# Patient Record
Sex: FEMALE | URBAN - METROPOLITAN AREA
[De-identification: names, ages, dates, MRNs, and addresses within clinical notes are randomized per-mention and may not be internally consistent; named-entity substitution may affect disease eponyms.]

---

## 2020-01-23 ENCOUNTER — OUTPATIENT (OUTPATIENT)
Dept: OUTPATIENT SERVICES | Facility: HOSPITAL | Age: 30
LOS: 1 days | End: 2020-01-23
Payer: MEDICAID

## 2020-01-23 DIAGNOSIS — Z3A.00 WEEKS OF GESTATION OF PREGNANCY NOT SPECIFIED: ICD-10-CM

## 2020-01-23 DIAGNOSIS — O26.899 OTHER SPECIFIED PREGNANCY RELATED CONDITIONS, UNSPECIFIED TRIMESTER: ICD-10-CM

## 2020-01-23 LAB
GLUCOSE BLDC GLUCOMTR-MCNC: 109 MG/DL — HIGH (ref 70–99)
GLUCOSE BLDC GLUCOMTR-MCNC: 78 MG/DL — SIGNIFICANT CHANGE UP (ref 70–99)

## 2020-01-23 PROCEDURE — 76819 FETAL BIOPHYS PROFIL W/O NST: CPT | Mod: 26

## 2020-01-23 PROCEDURE — G0463: CPT

## 2020-01-23 PROCEDURE — 82962 GLUCOSE BLOOD TEST: CPT

## 2020-01-23 PROCEDURE — 59025 FETAL NON-STRESS TEST: CPT | Mod: 26

## 2020-01-23 PROCEDURE — 76815 OB US LIMITED FETUS(S): CPT

## 2020-01-23 PROCEDURE — 59025 FETAL NON-STRESS TEST: CPT

## 2020-01-23 PROCEDURE — 76819 FETAL BIOPHYS PROFIL W/O NST: CPT

## 2020-01-23 PROCEDURE — 76815 OB US LIMITED FETUS(S): CPT | Mod: 26

## 2020-01-25 ENCOUNTER — OUTPATIENT (OUTPATIENT)
Dept: OUTPATIENT SERVICES | Facility: HOSPITAL | Age: 30
LOS: 1 days | End: 2020-01-25
Payer: MEDICAID

## 2020-01-25 DIAGNOSIS — O26.899 OTHER SPECIFIED PREGNANCY RELATED CONDITIONS, UNSPECIFIED TRIMESTER: ICD-10-CM

## 2020-01-25 DIAGNOSIS — Z3A.00 WEEKS OF GESTATION OF PREGNANCY NOT SPECIFIED: ICD-10-CM

## 2020-01-25 LAB — GLUCOSE BLDC GLUCOMTR-MCNC: 86 MG/DL — SIGNIFICANT CHANGE UP (ref 70–99)

## 2020-01-25 PROCEDURE — 59025 FETAL NON-STRESS TEST: CPT | Mod: 26

## 2020-01-25 PROCEDURE — 82962 GLUCOSE BLOOD TEST: CPT

## 2020-01-25 PROCEDURE — 59025 FETAL NON-STRESS TEST: CPT

## 2020-01-25 PROCEDURE — 76819 FETAL BIOPHYS PROFIL W/O NST: CPT | Mod: 26

## 2020-01-25 PROCEDURE — 76819 FETAL BIOPHYS PROFIL W/O NST: CPT

## 2020-01-25 PROCEDURE — G0463: CPT

## 2020-01-27 ENCOUNTER — OUTPATIENT (OUTPATIENT)
Dept: OUTPATIENT SERVICES | Facility: HOSPITAL | Age: 30
LOS: 1 days | End: 2020-01-27
Payer: MEDICAID

## 2020-01-27 DIAGNOSIS — Z3A.00 WEEKS OF GESTATION OF PREGNANCY NOT SPECIFIED: ICD-10-CM

## 2020-01-27 DIAGNOSIS — O26.899 OTHER SPECIFIED PREGNANCY RELATED CONDITIONS, UNSPECIFIED TRIMESTER: ICD-10-CM

## 2020-01-27 LAB — GLUCOSE BLDC GLUCOMTR-MCNC: 145 MG/DL — HIGH (ref 70–99)

## 2020-01-27 PROCEDURE — G0463: CPT

## 2020-01-27 PROCEDURE — 59025 FETAL NON-STRESS TEST: CPT | Mod: 26

## 2020-01-27 PROCEDURE — 59025 FETAL NON-STRESS TEST: CPT

## 2020-01-27 PROCEDURE — 82962 GLUCOSE BLOOD TEST: CPT

## 2020-02-13 ENCOUNTER — TRANSCRIPTION ENCOUNTER (OUTPATIENT)
Age: 30
End: 2020-02-13

## 2020-02-13 ENCOUNTER — INPATIENT (INPATIENT)
Facility: HOSPITAL | Age: 30
LOS: 2 days | Discharge: HOME | End: 2020-02-16
Attending: OBSTETRICS & GYNECOLOGY | Admitting: OBSTETRICS & GYNECOLOGY
Payer: MEDICAID

## 2020-02-13 ENCOUNTER — RESULT REVIEW (OUTPATIENT)
Age: 30
End: 2020-02-13

## 2020-02-13 VITALS — WEIGHT: 178.57 LBS | HEIGHT: 60 IN

## 2020-02-13 DIAGNOSIS — Z3A.00 WEEKS OF GESTATION OF PREGNANCY NOT SPECIFIED: ICD-10-CM

## 2020-02-13 DIAGNOSIS — Z34.80 ENCOUNTER FOR SUPERVISION OF OTHER NORMAL PREGNANCY, UNSPECIFIED TRIMESTER: ICD-10-CM

## 2020-02-13 DIAGNOSIS — O26.899 OTHER SPECIFIED PREGNANCY RELATED CONDITIONS, UNSPECIFIED TRIMESTER: ICD-10-CM

## 2020-02-13 LAB
ALBUMIN SERPL ELPH-MCNC: 2.5 G/DL — LOW (ref 3.5–5)
ALP SERPL-CCNC: 233 U/L — HIGH (ref 40–120)
ALT FLD-CCNC: 53 U/L DA — SIGNIFICANT CHANGE UP (ref 10–60)
ANION GAP SERPL CALC-SCNC: 8 MMOL/L — SIGNIFICANT CHANGE UP (ref 5–17)
APPEARANCE UR: ABNORMAL
APTT BLD: 27.4 SEC — LOW (ref 27.5–36.3)
AST SERPL-CCNC: 39 U/L — SIGNIFICANT CHANGE UP (ref 10–40)
BACTERIA # UR AUTO: ABNORMAL /HPF
BASOPHILS # BLD AUTO: 0.02 K/UL — SIGNIFICANT CHANGE UP (ref 0–0.2)
BASOPHILS NFR BLD AUTO: 0.2 % — SIGNIFICANT CHANGE UP (ref 0–2)
BILIRUB SERPL-MCNC: 0.3 MG/DL — SIGNIFICANT CHANGE UP (ref 0.2–1.2)
BILIRUB UR-MCNC: NEGATIVE — SIGNIFICANT CHANGE UP
BLD GP AB SCN SERPL QL: SIGNIFICANT CHANGE UP
BUN SERPL-MCNC: 12 MG/DL — SIGNIFICANT CHANGE UP (ref 7–18)
CALCIUM SERPL-MCNC: 8.4 MG/DL — SIGNIFICANT CHANGE UP (ref 8.4–10.5)
CHLORIDE SERPL-SCNC: 106 MMOL/L — SIGNIFICANT CHANGE UP (ref 96–108)
CO2 SERPL-SCNC: 23 MMOL/L — SIGNIFICANT CHANGE UP (ref 22–31)
COLOR SPEC: YELLOW — SIGNIFICANT CHANGE UP
CREAT ?TM UR-MCNC: 101 MG/DL — SIGNIFICANT CHANGE UP
CREAT SERPL-MCNC: 0.62 MG/DL — SIGNIFICANT CHANGE UP (ref 0.5–1.3)
DIFF PNL FLD: ABNORMAL
EOSINOPHIL # BLD AUTO: 0.44 K/UL — SIGNIFICANT CHANGE UP (ref 0–0.5)
EOSINOPHIL NFR BLD AUTO: 4.9 % — SIGNIFICANT CHANGE UP (ref 0–6)
EPI CELLS # UR: ABNORMAL /HPF
FIBRINOGEN PPP-MCNC: 650 MG/DL — HIGH (ref 350–510)
GLUCOSE BLDC GLUCOMTR-MCNC: 102 MG/DL — HIGH (ref 70–99)
GLUCOSE BLDC GLUCOMTR-MCNC: 105 MG/DL — HIGH (ref 70–99)
GLUCOSE SERPL-MCNC: 99 MG/DL — SIGNIFICANT CHANGE UP (ref 70–99)
GLUCOSE UR QL: NEGATIVE — SIGNIFICANT CHANGE UP
HCT VFR BLD CALC: 37.8 % — SIGNIFICANT CHANGE UP (ref 34.5–45)
HGB BLD-MCNC: 12.1 G/DL — SIGNIFICANT CHANGE UP (ref 11.5–15.5)
IMM GRANULOCYTES NFR BLD AUTO: 0.3 % — SIGNIFICANT CHANGE UP (ref 0–1.5)
INR BLD: 0.96 RATIO — SIGNIFICANT CHANGE UP (ref 0.88–1.16)
KETONES UR-MCNC: NEGATIVE — SIGNIFICANT CHANGE UP
LDH SERPL L TO P-CCNC: 240 U/L — HIGH (ref 120–225)
LEUKOCYTE ESTERASE UR-ACNC: ABNORMAL
LYMPHOCYTES # BLD AUTO: 1.18 K/UL — SIGNIFICANT CHANGE UP (ref 1–3.3)
LYMPHOCYTES # BLD AUTO: 13.2 % — SIGNIFICANT CHANGE UP (ref 13–44)
MCHC RBC-ENTMCNC: 26.8 PG — LOW (ref 27–34)
MCHC RBC-ENTMCNC: 32 GM/DL — SIGNIFICANT CHANGE UP (ref 32–36)
MCV RBC AUTO: 83.6 FL — SIGNIFICANT CHANGE UP (ref 80–100)
MONOCYTES # BLD AUTO: 0.48 K/UL — SIGNIFICANT CHANGE UP (ref 0–0.9)
MONOCYTES NFR BLD AUTO: 5.4 % — SIGNIFICANT CHANGE UP (ref 2–14)
NEUTROPHILS # BLD AUTO: 6.76 K/UL — SIGNIFICANT CHANGE UP (ref 1.8–7.4)
NEUTROPHILS NFR BLD AUTO: 76 % — SIGNIFICANT CHANGE UP (ref 43–77)
NITRITE UR-MCNC: NEGATIVE — SIGNIFICANT CHANGE UP
NRBC # BLD: 0 /100 WBCS — SIGNIFICANT CHANGE UP (ref 0–0)
PH UR: 5 — SIGNIFICANT CHANGE UP (ref 5–8)
PLATELET # BLD AUTO: 252 K/UL — SIGNIFICANT CHANGE UP (ref 150–400)
POTASSIUM SERPL-MCNC: 3.6 MMOL/L — SIGNIFICANT CHANGE UP (ref 3.5–5.3)
POTASSIUM SERPL-SCNC: 3.6 MMOL/L — SIGNIFICANT CHANGE UP (ref 3.5–5.3)
PROT ?TM UR-MCNC: 29 MG/DL — HIGH (ref 0–12)
PROT SERPL-MCNC: 7.2 G/DL — SIGNIFICANT CHANGE UP (ref 6–8.3)
PROT UR-MCNC: 15
PROTHROM AB SERPL-ACNC: 10.6 SEC — SIGNIFICANT CHANGE UP (ref 10–12.9)
RBC # BLD: 4.52 M/UL — SIGNIFICANT CHANGE UP (ref 3.8–5.2)
RBC # FLD: 15.1 % — HIGH (ref 10.3–14.5)
RBC CASTS # UR COMP ASSIST: >50 /HPF (ref 0–2)
SODIUM SERPL-SCNC: 137 MMOL/L — SIGNIFICANT CHANGE UP (ref 135–145)
SP GR SPEC: 1.02 — SIGNIFICANT CHANGE UP (ref 1.01–1.02)
URATE SERPL-MCNC: 3.8 MG/DL — SIGNIFICANT CHANGE UP (ref 2.5–7)
UROBILINOGEN FLD QL: 1
WBC # BLD: 8.91 K/UL — SIGNIFICANT CHANGE UP (ref 3.8–10.5)
WBC # FLD AUTO: 8.91 K/UL — SIGNIFICANT CHANGE UP (ref 3.8–10.5)
WBC UR QL: ABNORMAL /HPF (ref 0–5)

## 2020-02-13 PROCEDURE — 88307 TISSUE EXAM BY PATHOLOGIST: CPT | Mod: 26

## 2020-02-13 RX ORDER — DIPHENHYDRAMINE HCL 50 MG
25 CAPSULE ORAL EVERY 4 HOURS
Refills: 0 | Status: DISCONTINUED | OUTPATIENT
Start: 2020-02-13 | End: 2020-02-16

## 2020-02-13 RX ORDER — SODIUM CHLORIDE 9 MG/ML
1000 INJECTION, SOLUTION INTRAVENOUS
Refills: 0 | Status: DISCONTINUED | OUTPATIENT
Start: 2020-02-13 | End: 2020-02-14

## 2020-02-13 RX ORDER — AMPICILLIN TRIHYDRATE 250 MG
1 CAPSULE ORAL EVERY 4 HOURS
Refills: 0 | Status: DISCONTINUED | OUTPATIENT
Start: 2020-02-13 | End: 2020-02-14

## 2020-02-13 RX ORDER — SODIUM CHLORIDE 9 MG/ML
1000 INJECTION INTRAMUSCULAR; INTRAVENOUS; SUBCUTANEOUS
Refills: 0 | Status: DISCONTINUED | OUTPATIENT
Start: 2020-02-13 | End: 2020-02-14

## 2020-02-13 RX ORDER — AMPICILLIN TRIHYDRATE 250 MG
2 CAPSULE ORAL ONCE
Refills: 0 | Status: COMPLETED | OUTPATIENT
Start: 2020-02-13 | End: 2020-02-13

## 2020-02-13 RX ORDER — CITRIC ACID/SODIUM CITRATE 300-500 MG
15 SOLUTION, ORAL ORAL EVERY 6 HOURS
Refills: 0 | Status: DISCONTINUED | OUTPATIENT
Start: 2020-02-13 | End: 2020-02-14

## 2020-02-13 RX ADMIN — SODIUM CHLORIDE 125 MILLILITER(S): 9 INJECTION INTRAMUSCULAR; INTRAVENOUS; SUBCUTANEOUS at 17:03

## 2020-02-13 RX ADMIN — Medication 216 GRAM(S): at 17:03

## 2020-02-13 RX ADMIN — Medication 108 GRAM(S): at 21:50

## 2020-02-14 LAB
ABO RH CONFIRMATION: SIGNIFICANT CHANGE UP
BASOPHILS # BLD AUTO: 0.02 K/UL — SIGNIFICANT CHANGE UP (ref 0–0.2)
BASOPHILS NFR BLD AUTO: 0.2 % — SIGNIFICANT CHANGE UP (ref 0–2)
EOSINOPHIL # BLD AUTO: 0.26 K/UL — SIGNIFICANT CHANGE UP (ref 0–0.5)
EOSINOPHIL NFR BLD AUTO: 2.6 % — SIGNIFICANT CHANGE UP (ref 0–6)
GLUCOSE BLDC GLUCOMTR-MCNC: 88 MG/DL — SIGNIFICANT CHANGE UP (ref 70–99)
HCT VFR BLD CALC: 29 % — LOW (ref 34.5–45)
HGB BLD-MCNC: 9.1 G/DL — LOW (ref 11.5–15.5)
IMM GRANULOCYTES NFR BLD AUTO: 0.2 % — SIGNIFICANT CHANGE UP (ref 0–1.5)
LYMPHOCYTES # BLD AUTO: 1.37 K/UL — SIGNIFICANT CHANGE UP (ref 1–3.3)
LYMPHOCYTES # BLD AUTO: 13.5 % — SIGNIFICANT CHANGE UP (ref 13–44)
MCHC RBC-ENTMCNC: 26.5 PG — LOW (ref 27–34)
MCHC RBC-ENTMCNC: 31.4 GM/DL — LOW (ref 32–36)
MCV RBC AUTO: 84.3 FL — SIGNIFICANT CHANGE UP (ref 80–100)
MONOCYTES # BLD AUTO: 0.51 K/UL — SIGNIFICANT CHANGE UP (ref 0–0.9)
MONOCYTES NFR BLD AUTO: 5 % — SIGNIFICANT CHANGE UP (ref 2–14)
NEUTROPHILS # BLD AUTO: 7.96 K/UL — HIGH (ref 1.8–7.4)
NEUTROPHILS NFR BLD AUTO: 78.5 % — HIGH (ref 43–77)
NRBC # BLD: 0 /100 WBCS — SIGNIFICANT CHANGE UP (ref 0–0)
PLATELET # BLD AUTO: 209 K/UL — SIGNIFICANT CHANGE UP (ref 150–400)
RBC # BLD: 3.44 M/UL — LOW (ref 3.8–5.2)
RBC # FLD: 15.4 % — HIGH (ref 10.3–14.5)
T PALLIDUM AB TITR SER: NEGATIVE — SIGNIFICANT CHANGE UP
WBC # BLD: 10.14 K/UL — SIGNIFICANT CHANGE UP (ref 3.8–10.5)
WBC # FLD AUTO: 10.14 K/UL — SIGNIFICANT CHANGE UP (ref 3.8–10.5)

## 2020-02-14 RX ORDER — KETOROLAC TROMETHAMINE 30 MG/ML
30 SYRINGE (ML) INJECTION EVERY 6 HOURS
Refills: 0 | Status: DISCONTINUED | OUTPATIENT
Start: 2020-02-14 | End: 2020-02-15

## 2020-02-14 RX ORDER — HEPARIN SODIUM 5000 [USP'U]/ML
5000 INJECTION INTRAVENOUS; SUBCUTANEOUS EVERY 12 HOURS
Refills: 0 | Status: DISCONTINUED | OUTPATIENT
Start: 2020-02-14 | End: 2020-02-16

## 2020-02-14 RX ORDER — AZITHROMYCIN 500 MG/1
500 TABLET, FILM COATED ORAL ONCE
Refills: 0 | Status: COMPLETED | OUTPATIENT
Start: 2020-02-14 | End: 2020-02-14

## 2020-02-14 RX ORDER — OXYCODONE HYDROCHLORIDE 5 MG/1
5 TABLET ORAL
Refills: 0 | Status: DISCONTINUED | OUTPATIENT
Start: 2020-02-14 | End: 2020-02-14

## 2020-02-14 RX ORDER — ASCORBIC ACID 60 MG
500 TABLET,CHEWABLE ORAL DAILY
Refills: 0 | Status: DISCONTINUED | OUTPATIENT
Start: 2020-02-14 | End: 2020-02-16

## 2020-02-14 RX ORDER — SIMETHICONE 80 MG/1
80 TABLET, CHEWABLE ORAL EVERY 6 HOURS
Refills: 0 | Status: DISCONTINUED | OUTPATIENT
Start: 2020-02-14 | End: 2020-02-16

## 2020-02-14 RX ORDER — SIMETHICONE 80 MG/1
80 TABLET, CHEWABLE ORAL EVERY 4 HOURS
Refills: 0 | Status: DISCONTINUED | OUTPATIENT
Start: 2020-02-14 | End: 2020-02-14

## 2020-02-14 RX ORDER — FERROUS SULFATE 325(65) MG
325 TABLET ORAL DAILY
Refills: 0 | Status: DISCONTINUED | OUTPATIENT
Start: 2020-02-14 | End: 2020-02-16

## 2020-02-14 RX ORDER — CEFAZOLIN SODIUM 1 G
2000 VIAL (EA) INJECTION ONCE
Refills: 0 | Status: COMPLETED | OUTPATIENT
Start: 2020-02-14 | End: 2020-02-14

## 2020-02-14 RX ORDER — OXYTOCIN 10 UNIT/ML
333.33 VIAL (ML) INJECTION
Qty: 20 | Refills: 0 | Status: DISCONTINUED | OUTPATIENT
Start: 2020-02-14 | End: 2020-02-16

## 2020-02-14 RX ORDER — CALAMINE AND ZINC OXIDE AND PHENOL 160; 10 MG/ML; MG/ML
1 LOTION TOPICAL EVERY 8 HOURS
Refills: 0 | Status: DISCONTINUED | OUTPATIENT
Start: 2020-02-14 | End: 2020-02-16

## 2020-02-14 RX ORDER — LANOLIN
1 OINTMENT (GRAM) TOPICAL EVERY 6 HOURS
Refills: 0 | Status: DISCONTINUED | OUTPATIENT
Start: 2020-02-14 | End: 2020-02-16

## 2020-02-14 RX ORDER — SODIUM CHLORIDE 9 MG/ML
1000 INJECTION, SOLUTION INTRAVENOUS
Refills: 0 | Status: DISCONTINUED | OUTPATIENT
Start: 2020-02-14 | End: 2020-02-16

## 2020-02-14 RX ORDER — MORPHINE SULFATE 50 MG/1
4 CAPSULE, EXTENDED RELEASE ORAL EVERY 4 HOURS
Refills: 0 | Status: DISCONTINUED | OUTPATIENT
Start: 2020-02-14 | End: 2020-02-16

## 2020-02-14 RX ORDER — DIPHENHYDRAMINE HCL 50 MG
25 CAPSULE ORAL EVERY 6 HOURS
Refills: 0 | Status: DISCONTINUED | OUTPATIENT
Start: 2020-02-14 | End: 2020-02-16

## 2020-02-14 RX ORDER — ACETAMINOPHEN 500 MG
975 TABLET ORAL
Refills: 0 | Status: DISCONTINUED | OUTPATIENT
Start: 2020-02-14 | End: 2020-02-16

## 2020-02-14 RX ORDER — ACETAMINOPHEN 500 MG
1000 TABLET ORAL ONCE
Refills: 0 | Status: COMPLETED | OUTPATIENT
Start: 2020-02-14 | End: 2020-02-14

## 2020-02-14 RX ORDER — IBUPROFEN 200 MG
600 TABLET ORAL EVERY 6 HOURS
Refills: 0 | Status: DISCONTINUED | OUTPATIENT
Start: 2020-02-14 | End: 2020-02-16

## 2020-02-14 RX ORDER — TETANUS TOXOID, REDUCED DIPHTHERIA TOXOID AND ACELLULAR PERTUSSIS VACCINE, ADSORBED 5; 2.5; 8; 8; 2.5 [IU]/.5ML; [IU]/.5ML; UG/.5ML; UG/.5ML; UG/.5ML
0.5 SUSPENSION INTRAMUSCULAR ONCE
Refills: 0 | Status: DISCONTINUED | OUTPATIENT
Start: 2020-02-14 | End: 2020-02-16

## 2020-02-14 RX ADMIN — AZITHROMYCIN 255 MILLIGRAM(S): 500 TABLET, FILM COATED ORAL at 05:42

## 2020-02-14 RX ADMIN — Medication 975 MILLIGRAM(S): at 22:20

## 2020-02-14 RX ADMIN — HEPARIN SODIUM 5000 UNIT(S): 5000 INJECTION INTRAVENOUS; SUBCUTANEOUS at 18:05

## 2020-02-14 RX ADMIN — Medication 500 MILLIGRAM(S): at 12:58

## 2020-02-14 RX ADMIN — Medication 25 MILLIGRAM(S): at 02:48

## 2020-02-14 RX ADMIN — CALAMINE AND ZINC OXIDE AND PHENOL 1 APPLICATION(S): 160; 10 LOTION TOPICAL at 22:32

## 2020-02-14 RX ADMIN — Medication 30 MILLIGRAM(S): at 13:28

## 2020-02-14 RX ADMIN — Medication 15 MILLILITER(S): at 04:51

## 2020-02-14 RX ADMIN — Medication 100 MILLIGRAM(S): at 04:51

## 2020-02-14 RX ADMIN — Medication 400 MILLIGRAM(S): at 07:45

## 2020-02-14 RX ADMIN — Medication 30 MILLIGRAM(S): at 12:58

## 2020-02-14 RX ADMIN — Medication 1000 MILLIGRAM(S): at 08:15

## 2020-02-14 RX ADMIN — Medication 30 MILLIGRAM(S): at 18:31

## 2020-02-14 RX ADMIN — MORPHINE SULFATE 4 MILLIGRAM(S): 50 CAPSULE, EXTENDED RELEASE ORAL at 07:43

## 2020-02-14 RX ADMIN — Medication 1 TABLET(S): at 12:58

## 2020-02-14 RX ADMIN — SODIUM CHLORIDE 125 MILLILITER(S): 9 INJECTION, SOLUTION INTRAVENOUS at 04:00

## 2020-02-14 RX ADMIN — Medication 108 GRAM(S): at 00:53

## 2020-02-14 RX ADMIN — MORPHINE SULFATE 4 MILLIGRAM(S): 50 CAPSULE, EXTENDED RELEASE ORAL at 08:15

## 2020-02-14 RX ADMIN — Medication 975 MILLIGRAM(S): at 23:00

## 2020-02-14 RX ADMIN — Medication 30 MILLIGRAM(S): at 18:01

## 2020-02-14 RX ADMIN — Medication 1000 MILLIUNIT(S)/MIN: at 05:48

## 2020-02-14 RX ADMIN — SIMETHICONE 80 MILLIGRAM(S): 80 TABLET, CHEWABLE ORAL at 18:11

## 2020-02-14 NOTE — LACTATION INITIAL EVALUATION - LACTATION INTERVENTIONS
Breastfeeding on cue 8-12X/24 hours with diaper count to assess for adequate intake, safe skin to skin and rooming-in encouraged.

## 2020-02-14 NOTE — CHART NOTE - NSCHARTNOTEFT_GEN_A_CORE
OB PA Postop Note    Patient seen at bedside, resting comfortably offers no new complaints. Lane in place, tolerating clear diet at this time.  Attempting breastfeeding.  Denies HA, CP, SOB, N/V/D, dizziness, palpitations, worsening abdominal pain, worsening vaginal bleeding, or any other concerns.      Vital Signs Last 24 Hrs  T(C): 36.7 (14 Feb 2020 09:33), Max: 37 (14 Feb 2020 07:15)  T(F): 98.1 (14 Feb 2020 09:33), Max: 98.6 (14 Feb 2020 07:15)  HR: 78 (14 Feb 2020 09:33) (77 - 110)  BP: 119/78 (14 Feb 2020 09:33) (102/67 - 123/75)  BP(mean): 75 (14 Feb 2020 08:45) (73 - 84)  RR: 16 (14 Feb 2020 09:33) (16 - 25)  SpO2: 97% (14 Feb 2020 09:33) (97% - 100%)    Gen: A&O x 3, NAD  Chest: CTA B/L  Cardiac: S1, S2  RRR  Breast: Soft, nontender, nonengorged  Abdomen: +BS; soft; NT; ND; Dressing C/D/I, fundus firm  Gyn: Minimal lochia  Ext: Nontender, no worsening edema, venodynes               A/P: 28y/o POD#0 s/p stat primary c/s @40 weeks 5 days suspected abruption GDMA1, stable     -Pain management, postop care  -heparin dvt prophylaxis  -OOB and early ambulate  -incentive spirometry  -Advance diet with flatus  -Encourage breastfeeding   -d/w Dr. Junior

## 2020-02-15 LAB
BASOPHILS # BLD AUTO: 0.03 K/UL — SIGNIFICANT CHANGE UP (ref 0–0.2)
BASOPHILS NFR BLD AUTO: 0.3 % — SIGNIFICANT CHANGE UP (ref 0–2)
EOSINOPHIL # BLD AUTO: 0.51 K/UL — HIGH (ref 0–0.5)
EOSINOPHIL NFR BLD AUTO: 4.6 % — SIGNIFICANT CHANGE UP (ref 0–6)
GLUCOSE BLDC GLUCOMTR-MCNC: 90 MG/DL — SIGNIFICANT CHANGE UP (ref 70–99)
HCT VFR BLD CALC: 30.9 % — LOW (ref 34.5–45)
HGB BLD-MCNC: 9.8 G/DL — LOW (ref 11.5–15.5)
IMM GRANULOCYTES NFR BLD AUTO: 0.3 % — SIGNIFICANT CHANGE UP (ref 0–1.5)
LYMPHOCYTES # BLD AUTO: 1.8 K/UL — SIGNIFICANT CHANGE UP (ref 1–3.3)
LYMPHOCYTES # BLD AUTO: 16.3 % — SIGNIFICANT CHANGE UP (ref 13–44)
MCHC RBC-ENTMCNC: 26.9 PG — LOW (ref 27–34)
MCHC RBC-ENTMCNC: 31.7 GM/DL — LOW (ref 32–36)
MCV RBC AUTO: 84.9 FL — SIGNIFICANT CHANGE UP (ref 80–100)
MONOCYTES # BLD AUTO: 0.66 K/UL — SIGNIFICANT CHANGE UP (ref 0–0.9)
MONOCYTES NFR BLD AUTO: 6 % — SIGNIFICANT CHANGE UP (ref 2–14)
NEUTROPHILS # BLD AUTO: 8 K/UL — HIGH (ref 1.8–7.4)
NEUTROPHILS NFR BLD AUTO: 72.5 % — SIGNIFICANT CHANGE UP (ref 43–77)
NRBC # BLD: 0 /100 WBCS — SIGNIFICANT CHANGE UP (ref 0–0)
PLATELET # BLD AUTO: 242 K/UL — SIGNIFICANT CHANGE UP (ref 150–400)
RBC # BLD: 3.64 M/UL — LOW (ref 3.8–5.2)
RBC # FLD: 15.5 % — HIGH (ref 10.3–14.5)
WBC # BLD: 11.03 K/UL — HIGH (ref 3.8–10.5)
WBC # FLD AUTO: 11.03 K/UL — HIGH (ref 3.8–10.5)

## 2020-02-15 RX ADMIN — SIMETHICONE 80 MILLIGRAM(S): 80 TABLET, CHEWABLE ORAL at 23:49

## 2020-02-15 RX ADMIN — HEPARIN SODIUM 5000 UNIT(S): 5000 INJECTION INTRAVENOUS; SUBCUTANEOUS at 18:04

## 2020-02-15 RX ADMIN — Medication 975 MILLIGRAM(S): at 23:49

## 2020-02-15 RX ADMIN — Medication 30 MILLIGRAM(S): at 06:15

## 2020-02-15 RX ADMIN — Medication 1 TABLET(S): at 11:18

## 2020-02-15 RX ADMIN — SIMETHICONE 80 MILLIGRAM(S): 80 TABLET, CHEWABLE ORAL at 11:18

## 2020-02-15 RX ADMIN — Medication 325 MILLIGRAM(S): at 11:18

## 2020-02-15 RX ADMIN — Medication 600 MILLIGRAM(S): at 12:00

## 2020-02-15 RX ADMIN — Medication 500 MILLIGRAM(S): at 11:18

## 2020-02-15 RX ADMIN — Medication 30 MILLIGRAM(S): at 00:40

## 2020-02-15 RX ADMIN — SIMETHICONE 80 MILLIGRAM(S): 80 TABLET, CHEWABLE ORAL at 05:30

## 2020-02-15 RX ADMIN — Medication 30 MILLIGRAM(S): at 00:05

## 2020-02-15 RX ADMIN — SIMETHICONE 80 MILLIGRAM(S): 80 TABLET, CHEWABLE ORAL at 00:00

## 2020-02-15 RX ADMIN — Medication 30 MILLIGRAM(S): at 05:37

## 2020-02-15 RX ADMIN — Medication 600 MILLIGRAM(S): at 21:00

## 2020-02-15 RX ADMIN — HEPARIN SODIUM 5000 UNIT(S): 5000 INJECTION INTRAVENOUS; SUBCUTANEOUS at 05:37

## 2020-02-15 RX ADMIN — Medication 600 MILLIGRAM(S): at 20:04

## 2020-02-15 RX ADMIN — Medication 600 MILLIGRAM(S): at 11:18

## 2020-02-15 NOTE — PROGRESS NOTE ADULT - SUBJECTIVE AND OBJECTIVE BOX
[facility] offers no new complaints. + Ambulation, voiding without difficulty, + flatus; tolerating regular diet. both breastfeeding and bottle feeding. Denies HA, CP, SOB, N/V/D,  no bm; dizziness, palpitations, worsening abdominal pain, worsening vaginal bleeding, or any other concerns.   Vital Signs Last 24 Hrs  T(C): 36.8 (15 Feb 2020 06:17), Max: 37 (15 Feb 2020 01:33)  T(F): 98.3 (15 Feb 2020 06:17), Max: 98.6 (15 Feb 2020 01:33)  HR: 86 (15 Feb 2020 06:17) (72 - 89)  BP: 107/73 (15 Feb 2020 06:17) (97/63 - 119/78)  BP(mean): --  RR: 17 (15 Feb 2020 06:17) (16 - 17)  SpO2: 97% (15 Feb 2020 06:17) (97% - 98%)    Gen: A&O x 3, NAD  Chest: CTABL  Cardiac: S1+S2+ RRR  Breast: Soft, nontender, nonengorged  Abdomen: +BS; soft; Nontender, nondistended, dressing removed Incision C/D/I steri strips in place   Gyn: Minimal lochia  Extremities: Nontender, DTRS 2+, scattered rash on LE                         9.8    11.03 )-----------( 242      ( 15 Feb 2020 06:36 )             30.9       A/P: 29 year old POD #1 s/p c/s     -Pain management as needed  -cont post op care  -adv diet to regular   -OOB and ambulate  -f/u Rpt CBC in am   -encourage incentive spirometer use  -Encourage breastfeeding   -d/w dr. Junior

## 2020-02-15 NOTE — PROGRESS NOTE ADULT - PROBLEM SELECTOR PLAN 1
-Pain management as needed  -cont post op care  -adv diet to regular   -OOB and ambulate  -f/u Rpt CBC in am   -encourage incentive spirometer use  -Encourage breastfeeding   -d/w dr. Junior

## 2020-02-16 ENCOUNTER — TRANSCRIPTION ENCOUNTER (OUTPATIENT)
Age: 30
End: 2020-02-16

## 2020-02-16 VITALS
DIASTOLIC BLOOD PRESSURE: 58 MMHG | OXYGEN SATURATION: 98 % | TEMPERATURE: 98 F | SYSTOLIC BLOOD PRESSURE: 96 MMHG | HEART RATE: 84 BPM | RESPIRATION RATE: 16 BRPM

## 2020-02-16 DIAGNOSIS — D62 ACUTE POSTHEMORRHAGIC ANEMIA: ICD-10-CM

## 2020-02-16 LAB
BASOPHILS # BLD AUTO: 0.03 K/UL — SIGNIFICANT CHANGE UP (ref 0–0.2)
BASOPHILS NFR BLD AUTO: 0.3 % — SIGNIFICANT CHANGE UP (ref 0–2)
BILE AC FLD-MCNC: 5.3 UMOL/L — SIGNIFICANT CHANGE UP (ref 0–10)
EOSINOPHIL # BLD AUTO: 0.7 K/UL — HIGH (ref 0–0.5)
EOSINOPHIL NFR BLD AUTO: 6.5 % — HIGH (ref 0–6)
HCT VFR BLD CALC: 28 % — LOW (ref 34.5–45)
HGB BLD-MCNC: 8.6 G/DL — LOW (ref 11.5–15.5)
IMM GRANULOCYTES NFR BLD AUTO: 0.5 % — SIGNIFICANT CHANGE UP (ref 0–1.5)
LYMPHOCYTES # BLD AUTO: 18.7 % — SIGNIFICANT CHANGE UP (ref 13–44)
LYMPHOCYTES # BLD AUTO: 2 K/UL — SIGNIFICANT CHANGE UP (ref 1–3.3)
MCHC RBC-ENTMCNC: 26.1 PG — LOW (ref 27–34)
MCHC RBC-ENTMCNC: 30.7 GM/DL — LOW (ref 32–36)
MCV RBC AUTO: 85.1 FL — SIGNIFICANT CHANGE UP (ref 80–100)
MONOCYTES # BLD AUTO: 0.64 K/UL — SIGNIFICANT CHANGE UP (ref 0–0.9)
MONOCYTES NFR BLD AUTO: 6 % — SIGNIFICANT CHANGE UP (ref 2–14)
NEUTROPHILS # BLD AUTO: 7.3 K/UL — SIGNIFICANT CHANGE UP (ref 1.8–7.4)
NEUTROPHILS NFR BLD AUTO: 68 % — SIGNIFICANT CHANGE UP (ref 43–77)
NRBC # BLD: 0 /100 WBCS — SIGNIFICANT CHANGE UP (ref 0–0)
PLATELET # BLD AUTO: 222 K/UL — SIGNIFICANT CHANGE UP (ref 150–400)
RBC # BLD: 3.29 M/UL — LOW (ref 3.8–5.2)
RBC # FLD: 15.5 % — HIGH (ref 10.3–14.5)
WBC # BLD: 10.72 K/UL — HIGH (ref 3.8–10.5)
WBC # FLD AUTO: 10.72 K/UL — HIGH (ref 3.8–10.5)

## 2020-02-16 PROCEDURE — 88307 TISSUE EXAM BY PATHOLOGIST: CPT

## 2020-02-16 PROCEDURE — 86923 COMPATIBILITY TEST ELECTRIC: CPT

## 2020-02-16 PROCEDURE — 85730 THROMBOPLASTIN TIME PARTIAL: CPT

## 2020-02-16 PROCEDURE — 59050 FETAL MONITOR W/REPORT: CPT

## 2020-02-16 PROCEDURE — 80053 COMPREHEN METABOLIC PANEL: CPT

## 2020-02-16 PROCEDURE — 86900 BLOOD TYPING SEROLOGIC ABO: CPT

## 2020-02-16 PROCEDURE — G0463: CPT

## 2020-02-16 PROCEDURE — 85384 FIBRINOGEN ACTIVITY: CPT

## 2020-02-16 PROCEDURE — 82570 ASSAY OF URINE CREATININE: CPT

## 2020-02-16 PROCEDURE — 59025 FETAL NON-STRESS TEST: CPT

## 2020-02-16 PROCEDURE — 81001 URINALYSIS AUTO W/SCOPE: CPT

## 2020-02-16 PROCEDURE — 86780 TREPONEMA PALLIDUM: CPT

## 2020-02-16 PROCEDURE — 86850 RBC ANTIBODY SCREEN: CPT

## 2020-02-16 PROCEDURE — 36415 COLL VENOUS BLD VENIPUNCTURE: CPT

## 2020-02-16 PROCEDURE — 84550 ASSAY OF BLOOD/URIC ACID: CPT

## 2020-02-16 PROCEDURE — 86901 BLOOD TYPING SEROLOGIC RH(D): CPT

## 2020-02-16 PROCEDURE — 85027 COMPLETE CBC AUTOMATED: CPT

## 2020-02-16 PROCEDURE — 85610 PROTHROMBIN TIME: CPT

## 2020-02-16 PROCEDURE — 82962 GLUCOSE BLOOD TEST: CPT

## 2020-02-16 PROCEDURE — 83615 LACTATE (LD) (LDH) ENZYME: CPT

## 2020-02-16 PROCEDURE — 82239 BILE ACIDS TOTAL: CPT

## 2020-02-16 PROCEDURE — 84156 ASSAY OF PROTEIN URINE: CPT

## 2020-02-16 RX ORDER — DOCUSATE SODIUM 100 MG
1 CAPSULE ORAL
Qty: 60 | Refills: 0
Start: 2020-02-16 | End: 2020-03-16

## 2020-02-16 RX ORDER — FERROUS SULFATE 325(65) MG
1 TABLET ORAL
Qty: 30 | Refills: 0
Start: 2020-02-16 | End: 2020-03-16

## 2020-02-16 RX ORDER — IBUPROFEN 200 MG
1 TABLET ORAL
Qty: 20 | Refills: 0
Start: 2020-02-16 | End: 2020-02-20

## 2020-02-16 RX ADMIN — Medication 1 TABLET(S): at 13:16

## 2020-02-16 RX ADMIN — HEPARIN SODIUM 5000 UNIT(S): 5000 INJECTION INTRAVENOUS; SUBCUTANEOUS at 06:13

## 2020-02-16 RX ADMIN — Medication 600 MILLIGRAM(S): at 05:36

## 2020-02-16 RX ADMIN — Medication 500 MILLIGRAM(S): at 13:16

## 2020-02-16 RX ADMIN — Medication 325 MILLIGRAM(S): at 13:16

## 2020-02-16 RX ADMIN — SIMETHICONE 80 MILLIGRAM(S): 80 TABLET, CHEWABLE ORAL at 05:36

## 2020-02-16 RX ADMIN — SIMETHICONE 80 MILLIGRAM(S): 80 TABLET, CHEWABLE ORAL at 13:16

## 2020-02-16 RX ADMIN — Medication 975 MILLIGRAM(S): at 00:40

## 2020-02-16 NOTE — DISCHARGE NOTE OB - CARE PLAN
Principal Discharge DX:	 delivery delivered  Goal:	follow up with own ob/gyn in 2 weeks.  Assessment and plan of treatment:	no sex, pelvic rest, no heavy lifting  Secondary Diagnosis:	Acute blood loss as cause of postoperative anemia  Goal:	take iron and colace  Assessment and plan of treatment:	drink plenty of fluids

## 2020-02-16 NOTE — DISCHARGE NOTE OB - PATIENT PORTAL LINK FT
You can access the FollowMyHealth Patient Portal offered by [facility] by registering at the following website: [email or web address]. By joining [employer]’s FollowMyHealth portal, you will also be able to view your health information using other applications (apps) compatible with our system.

## 2020-02-16 NOTE — PROGRESS NOTE ADULT - SUBJECTIVE AND OBJECTIVE BOX
Patient seen at bedisde resting comfortably offers no new complaints. + Ambulation, + void without difficulty, + flatus; tolerating regular diet. both breastfeeding and bottle feeding. Denies HA, CP, SOB, N/V/D,  no bm; dizziness, palpitations, worsening abdominal pain, worsening vaginal bleeding, or any other concerns. pt wants to go home  Vital Signs Last 24 Hrs  T(C): 36.9 (16 Feb 2020 05:52), Max: 37 (15 Feb 2020 22:25)  T(F): 98.5 (16 Feb 2020 05:52), Max: 98.6 (15 Feb 2020 22:25)  HR: 90 (16 Feb 2020 05:52) (80 - 96)  BP: 112/75 (16 Feb 2020 05:52) (100/66 - 112/75)  BP(mean): --  RR: 17 (16 Feb 2020 05:52) (16 - 17)  SpO2: 97% (16 Feb 2020 05:52) (97% - 97%)    Gen: A&O x 3, NAD  Chest: CTABL  Cardiac: S1+S2+ RRR  Breast: Soft, nontender, nonengorged  Abdomen: +BS; soft; Nontender, nondistended, dressing removed Incision C/D/I steri strips in place   Gyn: Minimal lochia  Extremities: Nontender, DTRS 2+, no worsening edema                        8.6    10.72 )-----------( 222      ( 16 Feb 2020 07:02 )             28.0       A/P: POD #2 s/p c/s; acute blood loss anemia; asymptomatic  pt seen at bedside by   - discharge instructions  d/c home  -d/w dr.williams clayton

## 2020-02-16 NOTE — DISCHARGE NOTE OB - MEDICATION SUMMARY - MEDICATIONS TO TAKE
I will START or STAY ON the medications listed below when I get home from the hospital:    ibuprofen 600 mg oral tablet  -- 1 tab(s) by mouth every 6 hours, As needed, Moderate Pain (4 - 6)  -- Indication: For pain as needed    ferrous sulfate 325 mg (65 mg elemental iron) oral delayed release tablet  -- 1 tab(s) by mouth once a day   -- May discolor urine or feces.  Swallow whole.  Do not crush.    -- Indication: For anemia    Colace 100 mg oral capsule  -- 1 cap(s) by mouth 2 times a day   -- Medication should be taken with plenty of water.    -- Indication: For Stool softner

## 2020-02-16 NOTE — DISCHARGE NOTE OB - CARE PROVIDER_API CALL
Fide Saeed)  Obstetrics and Gynecology  [address] [place] [address]  Phone: [phone]  Fax: [phone]  Follow Up Time: 2 weeks

## 2020-02-16 NOTE — DISCHARGE NOTE OB - PLAN OF CARE
follow up with own ob/gyn in 2 weeks. no sex, pelvic rest, no heavy lifting take iron and colace drink plenty of fluids

## 2020-02-16 NOTE — PROGRESS NOTE ADULT - PROBLEM SELECTOR PLAN 2
A/P: POD #2 s/p c/s; acute blood loss anemia; asymptomatic  pt seen at bedside by   - discharge instructions  d/c home  -d/w dr.williams clayton

## 2020-09-02 NOTE — DISCHARGE NOTE OB - PERSISTENT HEADACHE, BLURRED VISION
Pt wanted to make sure that Dr Lydia Cardoso was going to send Prilosec to the pharmacy for him. Statement Selected

## 2022-10-21 PROBLEM — Z00.00 ENCOUNTER FOR PREVENTIVE HEALTH EXAMINATION: Status: ACTIVE | Noted: 2022-10-21

## 2022-10-24 ENCOUNTER — APPOINTMENT (OUTPATIENT)
Dept: ANTEPARTUM | Facility: CLINIC | Age: 32
End: 2022-10-24

## 2022-10-24 ENCOUNTER — NON-APPOINTMENT (OUTPATIENT)
Age: 32
End: 2022-10-24

## 2022-10-24 ENCOUNTER — ASOB RESULT (OUTPATIENT)
Age: 32
End: 2022-10-24

## 2022-10-24 PROCEDURE — 76801 OB US < 14 WKS SINGLE FETUS: CPT

## 2022-11-28 ENCOUNTER — APPOINTMENT (OUTPATIENT)
Dept: ANTEPARTUM | Facility: CLINIC | Age: 32
End: 2022-11-28

## 2023-01-09 ENCOUNTER — APPOINTMENT (OUTPATIENT)
Dept: ANTEPARTUM | Facility: CLINIC | Age: 33
End: 2023-01-09

## 2023-06-09 ENCOUNTER — INPATIENT (INPATIENT)
Facility: HOSPITAL | Age: 33
LOS: 0 days | Discharge: HOME | DRG: 776 | End: 2023-06-10
Attending: OBSTETRICS & GYNECOLOGY | Admitting: OBSTETRICS & GYNECOLOGY
Payer: COMMERCIAL

## 2023-06-09 VITALS
DIASTOLIC BLOOD PRESSURE: 89 MMHG | HEART RATE: 71 BPM | HEIGHT: 61 IN | WEIGHT: 195.11 LBS | SYSTOLIC BLOOD PRESSURE: 134 MMHG | TEMPERATURE: 99 F | RESPIRATION RATE: 16 BRPM

## 2023-06-09 LAB
HCT VFR BLD CALC: 32.8 % — LOW (ref 34.5–45)
HGB BLD-MCNC: 10.1 G/DL — LOW (ref 11.5–15.5)
MCHC RBC-ENTMCNC: 22.4 PG — LOW (ref 27–34)
MCHC RBC-ENTMCNC: 30.8 GM/DL — LOW (ref 32–36)
MCV RBC AUTO: 72.7 FL — LOW (ref 80–100)
PLATELET # BLD AUTO: 292 K/UL — SIGNIFICANT CHANGE UP (ref 150–400)
RBC # BLD: 4.51 M/UL — SIGNIFICANT CHANGE UP (ref 3.8–5.2)
RBC # FLD: 17.9 % — HIGH (ref 10.3–14.5)
WBC # BLD: 14.34 K/UL — HIGH (ref 3.8–10.5)
WBC # FLD AUTO: 14.34 K/UL — HIGH (ref 3.8–10.5)

## 2023-06-09 RX ORDER — KETOROLAC TROMETHAMINE 30 MG/ML
30 SYRINGE (ML) INJECTION ONCE
Refills: 0 | Status: DISCONTINUED | OUTPATIENT
Start: 2023-06-09 | End: 2023-06-09

## 2023-06-09 NOTE — OB RN PATIENT PROFILE - FALL HARM RISK - RISK INTERVENTIONS
Assistance OOB with selected safe patient handling equipment/Assistance with ambulation/Communicate Fall Risk and Risk Factors to all staff, patient, and family/Reinforce activity limits and safety measures with patient and family/Sit up slowly, dangle for a short time, stand at bedside before walking/Visual Cue: Yellow wristband/Bed in lowest position, wheels locked, appropriate side rails in place/Call bell, personal items and telephone in reach/Instruct patient to call for assistance before getting out of bed or chair/Non-slip footwear when patient is out of bed/[place] to call system/Physically safe environment - no spills, clutter or unnecessary equipment/Purposeful Proactive Rounding/Room/bathroom lighting operational, light cord in reach

## 2023-06-09 NOTE — OB RN PATIENT PROFILE - AS SC BRADEN SENSORY
Patient reports she has an infected finger that has turned green and she us unable to bend it. She requested an appointment. PSR unable to schedule same day with IM per protocol.     Please call back ASAP today to discuss recommendations.    (4) no impairment

## 2023-06-09 NOTE — OB RN PATIENT PROFILE - FUNCTIONAL ASSESSMENT - DAILY ACTIVITY 4.
3 = A little assistance Ivermectin Counseling:  Patient instructed to take medication on an empty stomach with a full glass of water.  Patient informed of potential adverse effects including but not limited to nausea, diarrhea, dizziness, itching, and swelling of the extremities or lymph nodes.  The patient verbalized understanding of the proper use and possible adverse effects of ivermectin.  All of the patient's questions and concerns were addressed.

## 2023-06-09 NOTE — OB RN PATIENT PROFILE - FUNCTIONAL ASSESSMENT - BASIC MOBILITY 6.
3-calculated by average/Not able to assess (calculate score using [facility] averaging method)

## 2023-06-10 ENCOUNTER — TRANSCRIPTION ENCOUNTER (OUTPATIENT)
Age: 33
End: 2023-06-10

## 2023-06-10 VITALS
SYSTOLIC BLOOD PRESSURE: 123 MMHG | HEART RATE: 66 BPM | DIASTOLIC BLOOD PRESSURE: 73 MMHG | RESPIRATION RATE: 18 BRPM | OXYGEN SATURATION: 98 % | TEMPERATURE: 98 F

## 2023-06-10 DIAGNOSIS — Z98.891 HISTORY OF UTERINE SCAR FROM PREVIOUS SURGERY: Chronic | ICD-10-CM

## 2023-06-10 LAB
ABO RH CONFIRMATION: SIGNIFICANT CHANGE UP
ALBUMIN SERPL ELPH-MCNC: 3.1 G/DL — LOW (ref 3.3–5.2)
ALP SERPL-CCNC: 209 U/L — HIGH (ref 40–120)
ALT FLD-CCNC: 16 U/L — SIGNIFICANT CHANGE UP
ANION GAP SERPL CALC-SCNC: 12 MMOL/L — SIGNIFICANT CHANGE UP (ref 5–17)
ANISOCYTOSIS BLD QL: SLIGHT — SIGNIFICANT CHANGE UP
APTT BLD: 24 SEC — LOW (ref 27.5–35.5)
AST SERPL-CCNC: 24 U/L — SIGNIFICANT CHANGE UP
BASOPHILS # BLD AUTO: 0 K/UL — SIGNIFICANT CHANGE UP (ref 0–0.2)
BASOPHILS NFR BLD AUTO: 0 % — SIGNIFICANT CHANGE UP (ref 0–2)
BILIRUB SERPL-MCNC: <0.2 MG/DL — LOW (ref 0.4–2)
BLD GP AB SCN SERPL QL: SIGNIFICANT CHANGE UP
BUN SERPL-MCNC: 12 MG/DL — SIGNIFICANT CHANGE UP (ref 8–20)
CALCIUM SERPL-MCNC: 8.6 MG/DL — SIGNIFICANT CHANGE UP (ref 8.4–10.5)
CHLORIDE SERPL-SCNC: 102 MMOL/L — SIGNIFICANT CHANGE UP (ref 96–108)
CO2 SERPL-SCNC: 21 MMOL/L — LOW (ref 22–29)
CREAT SERPL-MCNC: 0.64 MG/DL — SIGNIFICANT CHANGE UP (ref 0.5–1.3)
EGFR: 120 ML/MIN/1.73M2 — SIGNIFICANT CHANGE UP
EOSINOPHIL # BLD AUTO: 0 K/UL — SIGNIFICANT CHANGE UP (ref 0–0.5)
EOSINOPHIL NFR BLD AUTO: 0 % — SIGNIFICANT CHANGE UP (ref 0–6)
FIBRINOGEN PPP-MCNC: 542 MG/DL — HIGH (ref 200–450)
GLUCOSE SERPL-MCNC: 140 MG/DL — HIGH (ref 70–99)
HBV SURFACE AG SERPL QL IA: SIGNIFICANT CHANGE UP
HIV 1 & 2 AB SERPL IA.RAPID: SIGNIFICANT CHANGE UP
HIV 1+2 AB+HIV1 P24 AG SERPL QL IA: SIGNIFICANT CHANGE UP
INR BLD: 0.94 RATIO — SIGNIFICANT CHANGE UP (ref 0.88–1.16)
LDH SERPL L TO P-CCNC: 268 U/L — HIGH (ref 98–192)
LYMPHOCYTES # BLD AUTO: 1.38 K/UL — SIGNIFICANT CHANGE UP (ref 1–3.3)
LYMPHOCYTES # BLD AUTO: 9.6 % — LOW (ref 13–44)
MANUAL SMEAR VERIFICATION: SIGNIFICANT CHANGE UP
MICROCYTES BLD QL: SLIGHT — SIGNIFICANT CHANGE UP
MONOCYTES # BLD AUTO: 0.99 K/UL — HIGH (ref 0–0.9)
MONOCYTES NFR BLD AUTO: 6.9 % — SIGNIFICANT CHANGE UP (ref 2–14)
NEUTROPHILS # BLD AUTO: 11.97 K/UL — HIGH (ref 1.8–7.4)
NEUTROPHILS NFR BLD AUTO: 83.5 % — HIGH (ref 43–77)
PLAT MORPH BLD: NORMAL — SIGNIFICANT CHANGE UP
POLYCHROMASIA BLD QL SMEAR: SLIGHT — SIGNIFICANT CHANGE UP
POTASSIUM SERPL-MCNC: 4.2 MMOL/L — SIGNIFICANT CHANGE UP (ref 3.5–5.3)
POTASSIUM SERPL-SCNC: 4.2 MMOL/L — SIGNIFICANT CHANGE UP (ref 3.5–5.3)
PROT SERPL-MCNC: 6.5 G/DL — LOW (ref 6.6–8.7)
PROTHROM AB SERPL-ACNC: 10.9 SEC — SIGNIFICANT CHANGE UP (ref 10.5–13.4)
RBC BLD AUTO: ABNORMAL
SODIUM SERPL-SCNC: 135 MMOL/L — SIGNIFICANT CHANGE UP (ref 135–145)
URATE SERPL-MCNC: 4.3 MG/DL — SIGNIFICANT CHANGE UP (ref 2.4–5.7)

## 2023-06-10 PROCEDURE — 86765 RUBEOLA ANTIBODY: CPT

## 2023-06-10 PROCEDURE — 83615 LACTATE (LD) (LDH) ENZYME: CPT

## 2023-06-10 PROCEDURE — 84550 ASSAY OF BLOOD/URIC ACID: CPT

## 2023-06-10 PROCEDURE — 36415 COLL VENOUS BLD VENIPUNCTURE: CPT

## 2023-06-10 PROCEDURE — 87389 HIV-1 AG W/HIV-1&-2 AB AG IA: CPT

## 2023-06-10 PROCEDURE — 85730 THROMBOPLASTIN TIME PARTIAL: CPT

## 2023-06-10 PROCEDURE — 86703 HIV-1/HIV-2 1 RESULT ANTBDY: CPT

## 2023-06-10 PROCEDURE — 85025 COMPLETE CBC W/AUTO DIFF WBC: CPT

## 2023-06-10 PROCEDURE — 86850 RBC ANTIBODY SCREEN: CPT

## 2023-06-10 PROCEDURE — 85610 PROTHROMBIN TIME: CPT

## 2023-06-10 PROCEDURE — 86901 BLOOD TYPING SEROLOGIC RH(D): CPT

## 2023-06-10 PROCEDURE — 85384 FIBRINOGEN ACTIVITY: CPT

## 2023-06-10 PROCEDURE — 86780 TREPONEMA PALLIDUM: CPT

## 2023-06-10 PROCEDURE — 86762 RUBELLA ANTIBODY: CPT

## 2023-06-10 PROCEDURE — 80053 COMPREHEN METABOLIC PANEL: CPT

## 2023-06-10 PROCEDURE — 87340 HEPATITIS B SURFACE AG IA: CPT

## 2023-06-10 PROCEDURE — 86900 BLOOD TYPING SEROLOGIC ABO: CPT

## 2023-06-10 RX ORDER — IBUPROFEN 200 MG
600 TABLET ORAL EVERY 6 HOURS
Refills: 0 | Status: DISCONTINUED | OUTPATIENT
Start: 2023-06-10 | End: 2023-06-10

## 2023-06-10 RX ORDER — TETANUS TOXOID, REDUCED DIPHTHERIA TOXOID AND ACELLULAR PERTUSSIS VACCINE, ADSORBED 5; 2.5; 8; 8; 2.5 [IU]/.5ML; [IU]/.5ML; UG/.5ML; UG/.5ML; UG/.5ML
0.5 SUSPENSION INTRAMUSCULAR ONCE
Refills: 0 | Status: DISCONTINUED | OUTPATIENT
Start: 2023-06-10 | End: 2023-06-10

## 2023-06-10 RX ORDER — IBUPROFEN 200 MG
600 TABLET ORAL EVERY 6 HOURS
Refills: 0 | Status: COMPLETED | OUTPATIENT
Start: 2023-06-10 | End: 2024-05-08

## 2023-06-10 RX ORDER — AER TRAVELER 0.5 G/1
1 SOLUTION RECTAL; TOPICAL EVERY 4 HOURS
Refills: 0 | Status: DISCONTINUED | OUTPATIENT
Start: 2023-06-10 | End: 2023-06-10

## 2023-06-10 RX ORDER — OXYCODONE HYDROCHLORIDE 5 MG/1
5 TABLET ORAL
Refills: 0 | Status: DISCONTINUED | OUTPATIENT
Start: 2023-06-10 | End: 2023-06-10

## 2023-06-10 RX ORDER — SODIUM CHLORIDE 9 MG/ML
3 INJECTION INTRAMUSCULAR; INTRAVENOUS; SUBCUTANEOUS EVERY 8 HOURS
Refills: 0 | Status: DISCONTINUED | OUTPATIENT
Start: 2023-06-10 | End: 2023-06-10

## 2023-06-10 RX ORDER — HYDROCORTISONE 1 %
1 OINTMENT (GRAM) TOPICAL EVERY 6 HOURS
Refills: 0 | Status: DISCONTINUED | OUTPATIENT
Start: 2023-06-10 | End: 2023-06-10

## 2023-06-10 RX ORDER — PRAMOXINE HYDROCHLORIDE 150 MG/15G
1 AEROSOL, FOAM RECTAL EVERY 4 HOURS
Refills: 0 | Status: DISCONTINUED | OUTPATIENT
Start: 2023-06-10 | End: 2023-06-10

## 2023-06-10 RX ORDER — BENZOCAINE 10 %
1 GEL (GRAM) MUCOUS MEMBRANE EVERY 6 HOURS
Refills: 0 | Status: DISCONTINUED | OUTPATIENT
Start: 2023-06-10 | End: 2023-06-10

## 2023-06-10 RX ORDER — LANOLIN
1 OINTMENT (GRAM) TOPICAL EVERY 6 HOURS
Refills: 0 | Status: DISCONTINUED | OUTPATIENT
Start: 2023-06-10 | End: 2023-06-10

## 2023-06-10 RX ORDER — DIPHENHYDRAMINE HCL 50 MG
25 CAPSULE ORAL EVERY 6 HOURS
Refills: 0 | Status: DISCONTINUED | OUTPATIENT
Start: 2023-06-10 | End: 2023-06-10

## 2023-06-10 RX ORDER — SIMETHICONE 80 MG/1
80 TABLET, CHEWABLE ORAL EVERY 4 HOURS
Refills: 0 | Status: DISCONTINUED | OUTPATIENT
Start: 2023-06-10 | End: 2023-06-10

## 2023-06-10 RX ORDER — MAGNESIUM HYDROXIDE 400 MG/1
30 TABLET, CHEWABLE ORAL
Refills: 0 | Status: DISCONTINUED | OUTPATIENT
Start: 2023-06-10 | End: 2023-06-10

## 2023-06-10 RX ORDER — OXYCODONE HYDROCHLORIDE 5 MG/1
5 TABLET ORAL ONCE
Refills: 0 | Status: DISCONTINUED | OUTPATIENT
Start: 2023-06-10 | End: 2023-06-10

## 2023-06-10 RX ORDER — ACETAMINOPHEN 500 MG
3 TABLET ORAL
Qty: 180 | Refills: 0
Start: 2023-06-10 | End: 2023-06-24

## 2023-06-10 RX ORDER — KETOROLAC TROMETHAMINE 30 MG/ML
30 SYRINGE (ML) INJECTION ONCE
Refills: 0 | Status: DISCONTINUED | OUTPATIENT
Start: 2023-06-10 | End: 2023-06-10

## 2023-06-10 RX ORDER — IBUPROFEN 200 MG
1 TABLET ORAL
Qty: 60 | Refills: 0
Start: 2023-06-10 | End: 2023-06-24

## 2023-06-10 RX ORDER — ACETAMINOPHEN 500 MG
975 TABLET ORAL
Refills: 0 | Status: DISCONTINUED | OUTPATIENT
Start: 2023-06-10 | End: 2023-06-10

## 2023-06-10 RX ORDER — OXYTOCIN 10 UNIT/ML
41.67 VIAL (ML) INJECTION
Qty: 20 | Refills: 0 | Status: DISCONTINUED | OUTPATIENT
Start: 2023-06-10 | End: 2023-06-10

## 2023-06-10 RX ORDER — DIBUCAINE 1 %
1 OINTMENT (GRAM) RECTAL EVERY 6 HOURS
Refills: 0 | Status: DISCONTINUED | OUTPATIENT
Start: 2023-06-10 | End: 2023-06-10

## 2023-06-10 RX ADMIN — Medication 30 MILLIGRAM(S): at 01:01

## 2023-06-10 RX ADMIN — Medication 975 MILLIGRAM(S): at 10:07

## 2023-06-10 RX ADMIN — Medication 600 MILLIGRAM(S): at 05:23

## 2023-06-10 NOTE — DISCHARGE NOTE OB - CARE PROVIDER_API CALL
Natasha Aguirre  Obstetrics [place] [address]  Phone: [phone]  Fax: [phone]  Established Patient  Follow Up Time: 2 weeks

## 2023-06-10 NOTE — PROGRESS NOTE ADULT - SUBJECTIVE AND OBJECTIVE BOX
JOE KELLY is a 32y  now PPD#1 s/p extramural spontaneous vaginal delivery at 40w1d gestation.    S:    The patient has no complaints.  Pain controlled with current treatment regimen.   She is ambulating without difficulty and tolerating PO.   + flatus/-BM/+ voiding   She endorses appropriate lochia, which is decreasing.   She is breastfeeding without difficulty.   She denies fevers, chills, nausea and vomiting.   She denies lightheadedness, dizziness, palpitations, chest pain, SOB, RUQ pain, blurry vision, new-onset swelling, and blurry vision.    O:    T(C): 36.9 (06-10-23 @ 00:40), Max: 37 (23 @ 23:14)  HR: 71 (06-10-23 @ 00:40) (65 - 71)  BP: 116/73 (06-10-23 @ 00:40) (116/73 - 134/89)  RR: 18 (23 @ 23:39) (16 - 18)  SpO2: --    Gen: NAD, AOx3  CV: RRR, S1/S2 present  Pulm: CTAB  Breast: Nontender, non-engorged   Abdomen:  Soft, non-tender, non-distended, +bowel sounds  Uterus:  Fundus firm below umbilicus  VE:  Expected lochia  Ext:  Non-tender and non-edematous                          10.1   14.34 )-----------( 292      ( 2023 23:39 )             32.8         135  |  102  |  12.0  ----------------------------<  140<H>  4.2   |  21.0<L>  |  0.64    Ca    8.6      2023 23:39    TPro  6.5<L>  /  Alb  3.1<L>  /  TBili  <0.2<L>  /  DBili  x   /  AST  24  /  ALT  16  /  AlkPhos  209<H>

## 2023-06-10 NOTE — DISCHARGE NOTE OB - MATERIALS PROVIDED
Vaccinations/Breastfeeding Mother’s Support Group Information/Shaken Baby Prevention Handout/Breastfeeding Guide and Packet/Birth Certificate Instructions/Discharge Medication Information for Patients and Families Pocket Guide

## 2023-06-10 NOTE — OB RN DELIVERY SUMMARY - NS_SEPSISRSKCALC_OBGYN_ALL_OB_FT
No temperature has been documented for this patient in CPN or on the OB Flowsheet. Ensure the highest temperature during labor was documented on the OB Flowsheet.  Rupture of membranes must be entered above.   No temperature has been documented for this patient in CPN or on the OB Flowsheet. Ensure the highest temperature during labor was documented on the OB Flowsheet.

## 2023-06-10 NOTE — OB PROVIDER H&P - NSHPPHYSICALEXAM_GEN_ALL_CORE
T(C): 36.9 (06-10-23 @ 00:40), Max: 37 (06-09-23 @ 23:14)  HR: 71 (06-10-23 @ 00:40) (65 - 71)  BP: 116/73 (06-10-23 @ 00:40) (116/73 - 134/89)  RR: 18 (06-09-23 @ 23:39) (16 - 18)    Gen: NAD, AOx3  CV: RRR, S1/S2 present  Pulm: CTAB  Breast: Nontender, non-engorged   Abdomen:  Soft, non-tender, non-distended  Uterus:  Fundus firm below umbilicus  Pelvic: no blood expressed from uterus. Stitch noted on perineum and intact.  Ext:  Non-tender and non-edematous

## 2023-06-10 NOTE — OB PROVIDER H&P - NSLOWPPHRISK_OBGYN_A_OB
No previous uterine incision/Liz Pregnancy/Less than or equal to 4 previous vaginal births/No known bleeding disorder/No history of postpartum hemorrhage/No other PPH risks indicated

## 2023-06-10 NOTE — OB NEONATOLOGY/PEDIATRICIAN DELIVERY SUMMARY - NSPEDSNEONOTESA_OBGYN_ALL_OB_FT
OB requested me to evaluate 8 hrs old BB,  at home by mid wife Katerin for respiratory distress. The mom is 31 y/o,  A+, IV NR, HBsAg NR, RPR NR, RIM GBS neg at 40.1 days.   Delivery History:  with MSAF, well appearing at birth. At 6 hrs baby started grunting and retractions so brought to Hospital   Asst: [facility], BG, Extramural birth at home, respiratory distress of  likely due to meconium aspiration.   Plan: admit to NICU for further management

## 2023-06-10 NOTE — OB PROVIDER H&P - NSHPLABSRESULTS_GEN_ALL_CORE
10.1   14.34 )-----------( 292      ( 09 Jun 2023 23:39 )             32.8     06-09    135  |  102  |  12.0  ----------------------------<  140<H>  4.2   |  21.0<L>  |  0.64    Ca    8.6      09 Jun 2023 23:39    TPro  6.5<L>  /  Alb  3.1<L>  /  TBili  <0.2<L>  /  DBili  x   /  AST  24  /  ALT  16  /  AlkPhos  209<H>  06-09

## 2023-06-10 NOTE — DISCHARGE NOTE OB - MEDICATION SUMMARY - MEDICATIONS TO TAKE
I will START or STAY ON the medications listed below when I get home from the hospital:    ibuprofen 600 mg oral tablet  -- 1 tab(s) by mouth every 6 hours MDD: 4  -- Indication: For pain    acetaminophen 325 mg oral tablet  -- 3 tab(s) by mouth every 6 hours MDD: 4  -- Indication: For pain    ferrous sulfate 325 mg (65 mg elemental iron) oral delayed release tablet  -- 1 tab(s) by mouth once a day   -- May discolor urine or feces.  Swallow whole.  Do not crush.    -- Indication: For nutrition    Colace 100 mg oral capsule  -- 1 cap(s) by mouth 2 times a day   -- Medication should be taken with plenty of water.    -- Indication: For Home med

## 2023-06-10 NOTE — PROGRESS NOTE ADULT - ATTENDING COMMENTS
Patient seen and examined by me.  Agree with resident note.  Pain well controlled.  Tolerating regular diet, no nausea or vomiting.  Breastfeeding.  Minimal lochia.  Ambulating.  She is voiding without difficulty. Denies HA, dizziness, CP, SOB, LE pain.  VSS.  Fundus firm.  Labs reviewed.  Continue current care. As her baby is in the NICU, she would like to stay until tomorrow.

## 2023-06-10 NOTE — DISCHARGE NOTE OB - NS MD DC FALL RISK RISK
For information on Fall & Injury Prevention, visit: [email or web address] OR  [email or web address] OR  https://www.cdc.gov/steadi/patient.html

## 2023-06-10 NOTE — DISCHARGE NOTE OB - PATIENT PORTAL LINK FT
You can access the FollowMyHealth Patient Portal offered by [facility] by registering at the following website: [email or web address]. By joining [email or web address]’s FollowMyHealth portal, you will also be able to view your health information using other applications (apps) compatible with our system.

## 2023-06-10 NOTE — OB PROVIDER H&P - ASSESSMENT
32y  s/p extramural  presents for evaluation.    A/P:   -Admit to Post partum  -Consent  -VSS  -CBC and CMP stable  -Routine post-partum care and education  -Male infant, admitted to NICU for management of respiratory distress secondary to meconium aspiration  -Toradol for pain management    Discussed with Dr. Cardona

## 2023-06-10 NOTE — OB PROVIDER H&P - HISTORY OF PRESENT ILLNESS
32y  s/p extramural  presents for evaluation. Patient reports pelvic pain and a headache. Denies taking any pain medications since delivery. Patient reports she delivered at home with a midwife. It was a planned home delivery. Patient says it was overall uncomplicated delivery. She reports SROM with meconium stained fluid around 2:30PM followed by delivery of baby at 3:00PM. Patient reports mild vaginal bleeding since delivery. Denies fevers, chills, nausea, vomiting, chest pain, SOB, dizziness and headache. No other complaints at this time.     MODESTO: 2023  LMP: 8/15/2022    Prenatal course uncomplicated.      POB:  G1: pCS (), c/b GDM  G2:  ()  G3:  (today)  PGYN: -fibroids, -ovarian cysts, denies STD hx, denies abnormal PAPs   PMH: Denies  PSH: csecx1  SH: Denies EtOH, tobacco and illicit drug use during this pregnancy; feels safe at home   Meds: PNVs  Allergies: NKDA

## 2023-06-10 NOTE — OB PROVIDER H&P - ATTENDING COMMENTS
32y  s/p extramural  presents for evaluation due to fetal tachypnea, reassuring findings, consent for care signed after questions answered.

## 2023-06-10 NOTE — DISCHARGE NOTE OB - HOSPITAL COURSE
She is a  who presented at 40w1d GA after having an extramural vaginal delivery. She had a normal postpartum course and was discharged home in stable condition.

## 2023-06-10 NOTE — OB RN DELIVERY SUMMARY - BABY A: WEIGHT IN OUNCES (FROM GRAMS), DELIVERY
ACTIVITY: ambulate with assistance; consult with Nadiya Eaton RN --> pt. Ok for PT as tolerated/yes 8

## 2023-06-10 NOTE — PROGRESS NOTE ADULT - ASSESSMENT
JOE KELLY is a 32y  now PPD#1 s/p extramural spontaneous vaginal delivery at 40w1d gestation.    A/P:    -Vital signs stable  -Hgb: 10.1   -Voiding, tolerating PO, bowel function nml   -Advance care as tolerated   -Continue routine postpartum care and education  -Male infant, in NICU  -Dispo: Consider for discharge today

## 2023-06-11 LAB
MEV IGG SER-ACNC: 37.7 AU/ML — SIGNIFICANT CHANGE UP
MEV IGG+IGM SER-IMP: POSITIVE — SIGNIFICANT CHANGE UP
RUBV IGG SER-ACNC: 6.7 INDEX — SIGNIFICANT CHANGE UP
RUBV IGG SER-IMP: POSITIVE — SIGNIFICANT CHANGE UP
T PALLIDUM AB TITR SER: NEGATIVE — SIGNIFICANT CHANGE UP

## 2024-03-07 NOTE — DISCHARGE NOTE OB - NS OB DC IMMUNIZATIONS MMR YN
Patient called back \"Gabriele\"  communicated with him DEMI Junior question. Gabriele stated that he was taking the medication when he was in the office. Stated that he seen Dr. PANCHITO Colon on 5/7/23 and was diagnosed with right shoulder glenohumeral arthritis.    No

## 2024-12-21 ENCOUNTER — EMERGENCY (EMERGENCY)
Facility: HOSPITAL | Age: 34
LOS: 1 days | Discharge: LEFT BEFORE DISCHARGE | End: 2024-12-21
Attending: STUDENT IN AN ORGANIZED HEALTH CARE EDUCATION/TRAINING PROGRAM
Payer: COMMERCIAL

## 2024-12-21 VITALS
HEART RATE: 71 BPM | RESPIRATION RATE: 18 BRPM | OXYGEN SATURATION: 97 % | SYSTOLIC BLOOD PRESSURE: 138 MMHG | DIASTOLIC BLOOD PRESSURE: 75 MMHG | TEMPERATURE: 98 F

## 2024-12-21 DIAGNOSIS — Z98.891 HISTORY OF UTERINE SCAR FROM PREVIOUS SURGERY: Chronic | ICD-10-CM

## 2024-12-21 PROBLEM — O34.219 MATERNAL CARE FOR UNSPECIFIED TYPE SCAR FROM PREVIOUS CESAREAN DELIVERY: Chronic | Status: ACTIVE | Noted: 2023-06-10

## 2024-12-21 LAB
ALBUMIN SERPL ELPH-MCNC: 3.9 G/DL — SIGNIFICANT CHANGE UP (ref 3.3–5.2)
ALP SERPL-CCNC: 95 U/L — SIGNIFICANT CHANGE UP (ref 40–120)
ALT FLD-CCNC: 21 U/L — SIGNIFICANT CHANGE UP
ANION GAP SERPL CALC-SCNC: 14 MMOL/L — SIGNIFICANT CHANGE UP (ref 5–17)
APPEARANCE UR: ABNORMAL
AST SERPL-CCNC: 33 U/L — HIGH
BACTERIA # UR AUTO: NEGATIVE /HPF — SIGNIFICANT CHANGE UP
BASOPHILS # BLD AUTO: 0.04 K/UL — SIGNIFICANT CHANGE UP (ref 0–0.2)
BASOPHILS NFR BLD AUTO: 0.3 % — SIGNIFICANT CHANGE UP (ref 0–2)
BILIRUB SERPL-MCNC: <0.2 MG/DL — LOW (ref 0.4–2)
BILIRUB UR-MCNC: NEGATIVE — SIGNIFICANT CHANGE UP
BLD GP AB SCN SERPL QL: SIGNIFICANT CHANGE UP
BUN SERPL-MCNC: 15.9 MG/DL — SIGNIFICANT CHANGE UP (ref 8–20)
CALCIUM SERPL-MCNC: 8.9 MG/DL — SIGNIFICANT CHANGE UP (ref 8.4–10.5)
CAST: 1 /LPF — SIGNIFICANT CHANGE UP (ref 0–4)
CHLORIDE SERPL-SCNC: 103 MMOL/L — SIGNIFICANT CHANGE UP (ref 96–108)
CO2 SERPL-SCNC: 21 MMOL/L — LOW (ref 22–29)
COLOR SPEC: YELLOW — SIGNIFICANT CHANGE UP
CREAT SERPL-MCNC: 0.45 MG/DL — LOW (ref 0.5–1.3)
DIFF PNL FLD: ABNORMAL
EGFR: 129 ML/MIN/1.73M2 — SIGNIFICANT CHANGE UP
EOSINOPHIL # BLD AUTO: 0.1 K/UL — SIGNIFICANT CHANGE UP (ref 0–0.5)
EOSINOPHIL NFR BLD AUTO: 0.8 % — SIGNIFICANT CHANGE UP (ref 0–6)
GLUCOSE SERPL-MCNC: 137 MG/DL — HIGH (ref 70–99)
GLUCOSE UR QL: NEGATIVE MG/DL — SIGNIFICANT CHANGE UP
HCG SERPL-ACNC: 1516 MIU/ML — HIGH
HCT VFR BLD CALC: 39.3 % — SIGNIFICANT CHANGE UP (ref 34.5–45)
HGB BLD-MCNC: 12 G/DL — SIGNIFICANT CHANGE UP (ref 11.5–15.5)
IMM GRANULOCYTES NFR BLD AUTO: 0.5 % — SIGNIFICANT CHANGE UP (ref 0–0.9)
KETONES UR-MCNC: ABNORMAL MG/DL
LEUKOCYTE ESTERASE UR-ACNC: NEGATIVE — SIGNIFICANT CHANGE UP
LIDOCAIN IGE QN: 60 U/L — HIGH (ref 22–51)
LYMPHOCYTES # BLD AUTO: 1.35 K/UL — SIGNIFICANT CHANGE UP (ref 1–3.3)
LYMPHOCYTES # BLD AUTO: 10.3 % — LOW (ref 13–44)
MCHC RBC-ENTMCNC: 24.7 PG — LOW (ref 27–34)
MCHC RBC-ENTMCNC: 30.5 G/DL — LOW (ref 32–36)
MCV RBC AUTO: 81 FL — SIGNIFICANT CHANGE UP (ref 80–100)
MONOCYTES # BLD AUTO: 0.43 K/UL — SIGNIFICANT CHANGE UP (ref 0–0.9)
MONOCYTES NFR BLD AUTO: 3.3 % — SIGNIFICANT CHANGE UP (ref 2–14)
NEUTROPHILS # BLD AUTO: 11.13 K/UL — HIGH (ref 1.8–7.4)
NEUTROPHILS NFR BLD AUTO: 84.8 % — HIGH (ref 43–77)
NITRITE UR-MCNC: NEGATIVE — SIGNIFICANT CHANGE UP
PH UR: 7 — SIGNIFICANT CHANGE UP (ref 5–8)
PLATELET # BLD AUTO: 250 K/UL — SIGNIFICANT CHANGE UP (ref 150–400)
POTASSIUM SERPL-MCNC: 4.7 MMOL/L — SIGNIFICANT CHANGE UP (ref 3.5–5.3)
POTASSIUM SERPL-SCNC: 4.7 MMOL/L — SIGNIFICANT CHANGE UP (ref 3.5–5.3)
PROT SERPL-MCNC: 7.7 G/DL — SIGNIFICANT CHANGE UP (ref 6.6–8.7)
PROT UR-MCNC: SIGNIFICANT CHANGE UP MG/DL
RBC # BLD: 4.85 M/UL — SIGNIFICANT CHANGE UP (ref 3.8–5.2)
RBC # FLD: 15.9 % — HIGH (ref 10.3–14.5)
RBC CASTS # UR COMP ASSIST: 2 /HPF — SIGNIFICANT CHANGE UP (ref 0–4)
SODIUM SERPL-SCNC: 138 MMOL/L — SIGNIFICANT CHANGE UP (ref 135–145)
SP GR SPEC: 1.02 — SIGNIFICANT CHANGE UP (ref 1–1.03)
SQUAMOUS # UR AUTO: 7 /HPF — HIGH (ref 0–5)
UROBILINOGEN FLD QL: 1 MG/DL — SIGNIFICANT CHANGE UP (ref 0.2–1)
WBC # BLD: 13.11 K/UL — HIGH (ref 3.8–10.5)
WBC # FLD AUTO: 13.11 K/UL — HIGH (ref 3.8–10.5)
WBC UR QL: 4 /HPF — SIGNIFICANT CHANGE UP (ref 0–5)

## 2024-12-21 PROCEDURE — 80053 COMPREHEN METABOLIC PANEL: CPT

## 2024-12-21 PROCEDURE — 86900 BLOOD TYPING SEROLOGIC ABO: CPT

## 2024-12-21 PROCEDURE — 87086 URINE CULTURE/COLONY COUNT: CPT

## 2024-12-21 PROCEDURE — 81001 URINALYSIS AUTO W/SCOPE: CPT

## 2024-12-21 PROCEDURE — 86901 BLOOD TYPING SEROLOGIC RH(D): CPT

## 2024-12-21 PROCEDURE — 86850 RBC ANTIBODY SCREEN: CPT

## 2024-12-21 PROCEDURE — 99283 EMERGENCY DEPT VISIT LOW MDM: CPT

## 2024-12-21 PROCEDURE — 85025 COMPLETE CBC W/AUTO DIFF WBC: CPT

## 2024-12-21 PROCEDURE — 99284 EMERGENCY DEPT VISIT MOD MDM: CPT | Mod: 25

## 2024-12-21 PROCEDURE — 83690 ASSAY OF LIPASE: CPT

## 2024-12-21 PROCEDURE — 84702 CHORIONIC GONADOTROPIN TEST: CPT

## 2024-12-21 PROCEDURE — 36415 COLL VENOUS BLD VENIPUNCTURE: CPT

## 2024-12-21 RX ORDER — SODIUM CHLORIDE 9 MG/ML
1000 INJECTION, SOLUTION INTRAMUSCULAR; INTRAVENOUS; SUBCUTANEOUS ONCE
Refills: 0 | Status: COMPLETED | OUTPATIENT
Start: 2024-12-21 | End: 2024-12-21

## 2024-12-21 RX ORDER — ONDANSETRON HYDROCHLORIDE 4 MG/1
4 TABLET, FILM COATED ORAL ONCE
Refills: 0 | Status: COMPLETED | OUTPATIENT
Start: 2024-12-21 | End: 2024-12-21

## 2024-12-21 RX ORDER — ACETAMINOPHEN 500MG 500 MG/1
1000 TABLET, COATED ORAL ONCE
Refills: 0 | Status: DISCONTINUED | OUTPATIENT
Start: 2024-12-21 | End: 2024-12-21

## 2024-12-21 RX ORDER — ACETAMINOPHEN 500MG 500 MG/1
650 TABLET, COATED ORAL ONCE
Refills: 0 | Status: COMPLETED | OUTPATIENT
Start: 2024-12-21 | End: 2024-12-21

## 2024-12-21 RX ADMIN — ACETAMINOPHEN 500MG 650 MILLIGRAM(S): 500 TABLET, COATED ORAL at 07:15

## 2024-12-21 RX ADMIN — ACETAMINOPHEN 500MG 650 MILLIGRAM(S): 500 TABLET, COATED ORAL at 06:57

## 2024-12-21 NOTE — ED PROVIDER NOTE - OBJECTIVE STATEMENT
34 year old female no PMhx presents to ED for abdominal pain. Pt reports she woke up 2 hours prior to arrival with right sided abdominal pain, nausea, vomiting. Vomiting NBNB. +sweats. LMP 11/1, unsure if pregnant Denies fever, chills, lightheadedness, HA, dizziness, SOB, CP, dysuria, urgency, frequency, diarrhea.

## 2024-12-21 NOTE — ED PROVIDER NOTE - CLINICAL SUMMARY MEDICAL DECISION MAKING FREE TEXT BOX
34 year old female no PMhx presents to ED for abdominal pain. Pt reports she woke up 2 hours prior to arrival with right sided abdominal pain, nausea, vomiting. Vomiting NBNB. +sweats. LMP 11/1, unsure if pregnant Denies fever, chills, lightheadedness, HA, dizziness, SOB, CP, dysuria, urgency, frequency, diarrhea.   US, labs, re-assess 34 year old female no PMhx presents to ED for abdominal pain. Pt reports she woke up 2 hours prior to arrival with right sided abdominal pain, nausea, vomiting. Vomiting NBNB. +sweats. LMP 11/1, unsure if pregnant Denies fever, chills, lightheadedness, HA, dizziness, SOB, CP, dysuria, urgency, frequency, diarrhea.   US, labs, re-assess  ---------  Kaitlin Del Toro MD, Attending  35 yo F no pmhx pw abd pain and vomiting, Pt concerned she is pregnant as lmp was 11/1. Denies urinary symptoms, bleeding.   Gen: NAD  Head: NC/AT  Neck: trachea midline  Resp:  No distress  abd: + RUQ + epigastric ttp, non distended, no rebound guarding  Ext: no deformities  Neuro:  A&O appears non focal  Skin:  Warm and dry as visualized  Psych:  Normal affect and mood    ddx includes, but is not limited to the following: pregnancy, acute rolando, biliary colic, gastritis, gerd, pancreatitis.   plan: abd labs, RUQ UA, analgesia, antiemetic.  update: + elevated AST, leukocytosis, mildly elevated lipase, concerning for biliary etiology. Pt declined to wait for US, risks and hazards of not completing imaging explained to patient by PA, Pt understands she is putting her life and her fetus' life in potential danger. Pt understands she is signing out against medical advice, understands she can return to the ED for treatment or medical attention at any time if she changes her mind.

## 2024-12-21 NOTE — ED PROVIDER NOTE - PHYSICAL EXAMINATION
Gen: No acute distress, non toxic  HEENT: Mucous membranes moist, pink conjunctivae, EOMI  CV: RRR, nl s1/s2.  Resp: CTAB, normal rate and effort  GI: Abdomen soft, TTP RUQ, ND. No rebound, no guarding  : No CVAT  Neuro: A&O x 3, moving all 4 extremities  MSK: No spine or joint tenderness to palpation  Skin: No rashes. intact and perfused.

## 2024-12-21 NOTE — ED ADULT NURSE NOTE - NSFALLUNIVINTERV_ED_ALL_ED
Bed/Stretcher in lowest position, wheels locked, appropriate side rails in place/Call bell, personal items and telephone in reach/Instruct patient to call for assistance before getting out of bed/chair/stretcher/Non-slip footwear applied when patient is off stretcher/[place] to call system/Physically safe environment - no spills, clutter or unnecessary equipment/Purposeful proactive rounding/Room/bathroom lighting operational, light cord in reach

## 2024-12-21 NOTE — ED PROVIDER NOTE - PROGRESS NOTE DETAILS
patient re-assessed, newly found out she was pregnant, LMP, 11/1, approximately 6 weeks, f/u with Midwife LEO, Sierra Sawyer, reports pain to right side of abdomen, and vomiting 4 episodes this morning at 2am.  She denies any fevers, chills.  Patient is awaiting US.  Patient is not willing to stay, understands and agrees to proceed, will sign out AMA. patient re-assessed, newly found out she was pregnant, LMP, 11/1, approximately 6 weeks, f/u with Midwife LEO, Sierra Sawyer, reports pain to right side of abdomen, and vomiting 4 episodes this morning at 2am.  She denies any fevers, chills.  Patient is awaiting US.  Patient is not willing to stay, understands and agrees to proceed, will sign out AMA.  Understands and agrees to proceed, discussed results leaving AMA including death, sepsis or worsening symptoms.

## 2024-12-21 NOTE — ED ADULT TRIAGE NOTE - CHIEF COMPLAINT QUOTE
Pt BIBA stated that she has been experiencing abdominal pain starting @ 2AM.  Emesis x4, denies diarrhea.  LMP 11/1/24

## 2024-12-21 NOTE — ED PROVIDER NOTE - ATTENDING APP SHARED VISIT CONTRIBUTION OF CARE
Dr. Del Toro: I personally saw the patient with the ACP and performed a substantive portion of the visit. I performed a face to face bedside interview with patient regarding history of present illness, review of symptoms and past medical history. I completed an independent physical exam and all aspects of medical decision making. I have discussed patient's plan of care with ACP. I agree with note as stated above, having amended the EMR as needed to reflect my findings. This includes HISTORY OF PRESENT ILLNESS, HIV, PAST MEDICAL/SURGICAL/FAMILY/SOCIAL HISTORY, ALLERGIES AND HOME MEDICATIONS, ROS, PHYSICAL EXAM, MEDICAL DECISION MAKING and any PROGRESS NOTES during the time I functioned as the attending physician for this patient.  SEE MDM

## 2024-12-22 LAB
CULTURE RESULTS: SIGNIFICANT CHANGE UP
SPECIMEN SOURCE: SIGNIFICANT CHANGE UP

## 2025-05-29 ENCOUNTER — EMERGENCY (EMERGENCY)
Facility: HOSPITAL | Age: 35
LOS: 1 days | End: 2025-05-29
Attending: STUDENT IN AN ORGANIZED HEALTH CARE EDUCATION/TRAINING PROGRAM
Payer: COMMERCIAL

## 2025-05-29 VITALS
DIASTOLIC BLOOD PRESSURE: 83 MMHG | OXYGEN SATURATION: 98 % | WEIGHT: 175.05 LBS | HEART RATE: 82 BPM | SYSTOLIC BLOOD PRESSURE: 136 MMHG | TEMPERATURE: 98 F | RESPIRATION RATE: 18 BRPM

## 2025-05-29 VITALS
SYSTOLIC BLOOD PRESSURE: 124 MMHG | HEART RATE: 73 BPM | DIASTOLIC BLOOD PRESSURE: 83 MMHG | TEMPERATURE: 98 F | OXYGEN SATURATION: 98 % | RESPIRATION RATE: 18 BRPM

## 2025-05-29 DIAGNOSIS — Z98.891 HISTORY OF UTERINE SCAR FROM PREVIOUS SURGERY: Chronic | ICD-10-CM

## 2025-05-29 PROCEDURE — 99284 EMERGENCY DEPT VISIT MOD MDM: CPT

## 2025-05-29 PROCEDURE — 99283 EMERGENCY DEPT VISIT LOW MDM: CPT

## 2025-05-29 RX ORDER — TETRACAINE HYDROCHLORIDE 5 MG/ML
1 SOLUTION OPHTHALMIC ONCE
Refills: 0 | Status: COMPLETED | OUTPATIENT
Start: 2025-05-29 | End: 2025-05-29

## 2025-05-29 RX ADMIN — Medication 1 DROP(S): at 09:51

## 2025-05-29 RX ADMIN — TETRACAINE HYDROCHLORIDE 1 DROP(S): 5 SOLUTION OPHTHALMIC at 09:51

## 2025-05-29 NOTE — ED PROVIDER NOTE - NSFOLLOWUPINSTRUCTIONS_ED_ALL_ED_FT
Eye Pain    WHAT YOU NEED TO KNOW:    What causes eye pain? Eye pain may be caused by a problem within your eye. A problem or condition in another body area can also cause pain that travels to your eye. Eye pain may be caused by any of the following:    Dry eyes    An abrasion on your cornea (the surface of your eye)    A foreign body in your eye    Inflammation of a nerve, gland, or muscle in your eye    Certain types of glaucoma (increased pressure inside your eye that can cause vision loss)    A sinus infection or jaw pain    Headaches, including migraine or cluster headaches  How is the cause of eye pain diagnosed? Your healthcare provider will examine your eyes and ask when your pain began. He or she will also ask if you have other symptoms, such as sensitivity to light. Tell him or her if you ever had eye surgery or an eye injury. Tell him or her if you wear glasses or contact lenses. Also tell him or her the names of medicines you take, and if you have allergies or health conditions. You may need the following tests:    A visual acuity test checks your vision in both eyes. You will be asked to read letters and numbers from a chart.    A slit-lamp exam uses a microscope to check every part of your eye for inflammation or injury. A dye may be used to look for damage to your cornea.        A fluorescein stain test uses dye to show if you have a foreign body in your eye. It can also reveal damage to your cornea.    A tonometry test measures the pressure inside your eye to check for glaucoma. Your provider will numb your eyes with eyedrops before he or she checks your eye pressure.  How is eye pain treated?    Artificial tears are eyedrops that can help moisturize your eyes and relieve your pain. Ask your provider how often to use artificial tears.    NSAIDs, such as ibuprofen, help decrease swelling, pain, and fever. This medicine is available with or without a doctor's order. NSAIDs can cause stomach bleeding or kidney problems in certain people. If you take blood thinner medicine, always ask if NSAIDs are safe for you. Always read the medicine label and follow directions. Do not give these medicines to children younger than 6 months without direction from a healthcare provider.  When should I contact my healthcare provider?    You have a fever.    Your eye pain gets worse when you move your eyes.    You have questions or concerns about your condition or care.  When should I seek immediate care or call 911?    You have any vision loss.    You have sudden vision changes such as blurred vision, double vision, or seeing halos around lights.    You develop severe eye pain.  CARE AGREEMENT:    You have the right to help plan your care. Learn about your health condition and how it may be treated. Discuss treatment options with your healthcare providers to decide what care you want to receive. You always have the right to refuse treatment.

## 2025-05-29 NOTE — ED ADULT NURSE NOTE - OBJECTIVE STATEMENT
Patient A and O x 3. Came to ED di\ue to (R) eye pain after her child accidentally spilled an unknown chemical on it. Went to urgent care and was given antibiotic eye drops. Patient was unable to open her eye around 3:00 am today prompting her to go to ED. Noted patients inability to open eye, with grayish discharge.

## 2025-05-29 NOTE — ED ADULT NURSE REASSESSMENT NOTE - NS ED NURSE REASSESS COMMENT FT1
Upon initial presentation to ED, pt was unable to open her left eye. MD at the bedside to do a wash out - pt is now able to open the left eye. Eye is noted to be red. Vision is intact. Pt denies blurry vision, double vision or vision loss. Upon initial presentation to ED, pt was unable to open her right eye. MD at the bedside to do a wash out - pt is now able to open the right eye. Eye is noted to be red. Vision is intact. Pt denies blurry vision, double vision or vision loss.

## 2025-05-29 NOTE — ED PROVIDER NOTE - NSCAREINITIATED _GEN_ER
October 16, 2017      Lizet Condon  [address] [facility] [id number]        Dear Lizet,       We care about your health and have reviewed your health plan including your medical conditions, medications, and lab results.  Based on this review, it is recommended that you follow up regarding the following health topic(s):  -Cervical Cancer Screening    We recommend you take the following action(s):  -schedule a PAP SMEAR EXAM which is due.  Please disregard this reminder if you have had this exam elsewhere within the last year.  It would be helpful for us to have a copy of your recent pap smear report to update your records.     Please call us at the [facility] - [phone] (or use [facility] [employer]) to address the above recommendations.     Thank you for trusting [facility] and we appreciate the opportunity to serve you.  We look forward to supporting your healthcare needs in the future.    Healthy Regards,    Your Health Care Team  [facility]                
Karla Junior(Resident)
[phone]

## 2025-05-29 NOTE — ED ADULT TRIAGE NOTE - CHIEF COMPLAINT QUOTE
patient complaining of right eye pain, redness, unable to open eye since 3am.  Patient reports being struck in the eye by a gel pack opened and chemicals got in her eye, states can't see out of eye.  Was seen in urgent care yesterday, was able to open her right eye and denied acuity changes yesterday

## 2025-05-29 NOTE — ED PROVIDER NOTE - ATTENDING CONTRIBUTION TO CARE
I have personally performed a history and physical examination of the patient and discussed management with the resident as well as the patient.  I reviewed the resident's note and agree with the documented findings and plan of care.  I have authored and modified critical sections of the Provider Note, including but not limited to HPI, Physical Exam and MDM.    HPI: 34-year-old female no past medical history presents after getting gel in her eye from a "Swooshy squeezy" gel pack.  Patient states she was applying the gel pack when it ripped open and dumped in her eye.  Complaining of right eye pain without vision changes.  Did not get a in her mouth.  Patient did not rinse her eye out prior to arrival.  No other current complaints at this time.    ROS:   General: No fever, no chills, no malaise, no fatigue  Eye: See HPI  ENT: No earache, no coryza, no sore throat  Neck: No stiffness, no swollen glands, no dysphagia  Respiratory: No cough, no dyspnea, no pleuritic chest pain  Cardiac: no chest pain, no palpitations, no edema, no jvd  Abdomen: No abdominal pain, no nausea, no vomiting, no diarrhea  : No dysuria, no increase frequency, no urgency, No discharge  Musculoskeletal: No myalgia, no arthralgia  Neurologic: No headache, no vertigo, no paresthesia, no focal deficits  Skin: No rash, no evidence of trauma  All other ROS are negative    PE:  General: NAD; well appearing; A&O x3   Head: NC/AT  Eyes: PERRL, EOMI, injected sclera of the right eye.  Visual acuity 20/20 OD/OU/OS.  pH post irrigation 7-8  ENT: Airway patent, mmm  Back: Normal  spine  Extremities: FROM, symmetric pulses  Skin: no rash or bruising  Neurologic: alert, speech clear, no focal deficits    MDM: 58-year-old female past medical history of smoking presents complaining of difficulty breathing.  Eye irrigated without complication.  Will stain with fluorescein to evaluate for corneal abrasion.  Will provide symptomatic control as needed.  Chemical components of provide a gel pack evaluated, contains predominantly water/sugar/ascorbic acid/dextrose.  Outpatient follow-up. I have personally performed a history and physical examination of the patient and discussed management with the resident as well as the patient.  I reviewed the resident's note and agree with the documented findings and plan of care.  I have authored and modified critical sections of the Provider Note, including but not limited to HPI, Physical Exam and MDM.    HPI: 34-year-old female no past medical history presents after getting gel in her eye from a "Swooshy squeezy" gel pack.  Patient states she was applying the gel pack when it ripped open and dumped in her eye.  Complaining of right eye pain without vision changes.  Did not get a in her mouth.  Patient did not rinse her eye out prior to arrival.  No other current complaints at this time.    ROS:   General: No fever, no chills, no malaise, no fatigue  Eye: See HPI  ENT: No earache, no coryza, no sore throat  Neck: No stiffness, no swollen glands, no dysphagia  Respiratory: No cough, no dyspnea, no pleuritic chest pain  Cardiac: no chest pain, no palpitations, no edema, no jvd  Abdomen: No abdominal pain, no nausea, no vomiting, no diarrhea  : No dysuria, no increase frequency, no urgency, No discharge  Musculoskeletal: No myalgia, no arthralgia  Neurologic: No headache, no vertigo, no paresthesia, no focal deficits  Skin: No rash, no evidence of trauma  All other ROS are negative    PE:  General: NAD; well appearing; A&O x3   Head: NC/AT  Eyes: PERRL, EOMI, injected sclera of the right eye.  Visual acuity 20/20 OD/OU/OS.  pH post irrigation 7-8  ENT: Airway patent, mmm  Back: Normal  spine  Extremities: FROM, symmetric pulses  Skin: no rash or bruising  Neurologic: alert, speech clear, no focal deficits    MDM: 34-year-old female no past medical history presents after getting gel in her eye from a "Swooshy squeezy" gel pack.  Eye irrigated without complication.  Will stain with fluorescein to evaluate for corneal abrasion.  Will provide symptomatic control as needed.  Chemical components of provide a gel pack evaluated, contains predominantly water/sugar/ascorbic acid/dextrose.  Outpatient follow-up.

## 2025-05-29 NOTE — ED PROVIDER NOTE - PATIENT PORTAL LINK FT
You can access the FollowMyHealth Patient Portal offered by [facility] by registering at the following website: [email or web address]. By joining [facility]’s FollowMyHealth portal, you will also be able to view your health information using other applications (apps) compatible with our system.

## 2025-05-29 NOTE — ED PROVIDER NOTE - MDM ORDERS SUBMITTED SELECTION
Was the patient seen in the last year in this department? Yes    Does patient have an active prescription for medications requested? Yes    Received Request Via: Patient   Medications

## 2025-05-29 NOTE — ED ADULT NURSE REASSESSMENT NOTE - NS ED NURSE REASSESS COMMENT FT1
Patient (R) eye was irrigated by MD, now able to open eye and can see. States vision is clear now. Noted redness to eyes. No visible drainage noted at this time.